# Patient Record
Sex: MALE | Race: WHITE | NOT HISPANIC OR LATINO | Employment: OTHER | URBAN - METROPOLITAN AREA
[De-identification: names, ages, dates, MRNs, and addresses within clinical notes are randomized per-mention and may not be internally consistent; named-entity substitution may affect disease eponyms.]

---

## 2021-01-29 PROBLEM — N40.1 BENIGN PROSTATIC HYPERPLASIA WITH LOWER URINARY TRACT SYMPTOMS: Status: ACTIVE | Noted: 2021-01-29

## 2021-01-29 PROBLEM — N43.3 HYDROCELE, RIGHT: Status: ACTIVE | Noted: 2021-01-29

## 2021-01-29 PROBLEM — R30.0 DYSURIA: Status: ACTIVE | Noted: 2021-01-29

## 2021-05-17 PROBLEM — D80.3 IGG3 SUBCLASS DEFICIENCY: Status: ACTIVE | Noted: 2021-05-17

## 2021-05-17 PROBLEM — D80.3 IGG DEFICIENCY: Status: ACTIVE | Noted: 2021-05-17

## 2021-05-17 PROBLEM — J02.9 PHARYNGITIS: Status: ACTIVE | Noted: 2021-05-17

## 2021-05-25 PROBLEM — H53.2 DIPLOPIA: Status: ACTIVE | Noted: 2021-05-25

## 2021-05-25 PROBLEM — H50.21 HYPERTROPIA OF RIGHT EYE: Status: ACTIVE | Noted: 2021-05-25

## 2021-05-25 PROBLEM — H49.11 RIGHT SUPERIOR OBLIQUE PALSY: Status: ACTIVE | Noted: 2021-05-25

## 2021-07-19 PROBLEM — D72.819 LEUKOPENIA: Status: ACTIVE | Noted: 2021-07-19

## 2021-08-11 PROBLEM — N40.1 BENIGN PROSTATIC HYPERPLASIA WITH LOWER URINARY TRACT SYMPTOMS: Chronic | Status: ACTIVE | Noted: 2021-01-29

## 2021-08-11 PROBLEM — J02.9 PHARYNGITIS: Chronic | Status: ACTIVE | Noted: 2021-05-17

## 2021-08-11 PROBLEM — D80.3 IGG3 SUBCLASS DEFICIENCY: Chronic | Status: ACTIVE | Noted: 2021-05-17

## 2021-08-11 PROBLEM — N43.3 HYDROCELE, RIGHT: Chronic | Status: ACTIVE | Noted: 2021-01-29

## 2021-08-11 PROBLEM — R30.0 DYSURIA: Status: RESOLVED | Noted: 2021-01-29 | Resolved: 2021-08-11

## 2021-08-11 PROBLEM — D72.819 LEUKOPENIA: Chronic | Status: ACTIVE | Noted: 2021-07-19

## 2021-08-11 PROBLEM — H50.21 HYPERTROPIA OF RIGHT EYE: Chronic | Status: ACTIVE | Noted: 2021-05-25

## 2021-08-11 PROBLEM — D80.3 IGG DEFICIENCY: Chronic | Status: ACTIVE | Noted: 2021-05-17

## 2021-08-15 PROBLEM — L20.9 ATOPIC DERMATITIS: Chronic | Status: ACTIVE | Noted: 2021-08-15

## 2021-08-15 PROBLEM — R29.810 WEAKNESS ON LEFT SIDE OF FACE: Chronic | Status: ACTIVE | Noted: 2021-08-15

## 2021-09-28 PROBLEM — G50.0 TRIGEMINAL NEURALGIA: Status: ACTIVE | Noted: 2021-09-28

## 2022-05-18 PROBLEM — G50.0 TRIGEMINAL NEURALGIA: Status: RESOLVED | Noted: 2021-09-28 | Resolved: 2022-05-18

## 2022-05-18 PROBLEM — H53.2 DIPLOPIA: Status: RESOLVED | Noted: 2021-05-25 | Resolved: 2022-05-18

## 2022-05-18 PROBLEM — G56.22 ULNAR NEUROPATHY OF LEFT UPPER EXTREMITY: Status: ACTIVE | Noted: 2022-05-18

## 2022-05-18 PROBLEM — M54.12 CERVICAL RADICULOPATHY AT C8: Status: ACTIVE | Noted: 2022-05-18

## 2022-06-20 PROBLEM — N41.1 CHRONIC PROSTATITIS: Status: ACTIVE | Noted: 2022-06-20

## 2022-07-11 PROBLEM — D69.6 THROMBOCYTOPENIA: Status: ACTIVE | Noted: 2022-07-11

## 2022-07-17 PROBLEM — Z79.899 LONG-TERM CURRENT USE OF INTRAVENOUS IMMUNOGLOBULIN (IVIG): Status: ACTIVE | Noted: 2022-07-17

## 2022-09-26 PROBLEM — N28.1 ACQUIRED RENAL CYST: Status: ACTIVE | Noted: 2022-09-26

## 2022-10-17 PROBLEM — R74.8 ELEVATED ALKALINE PHOSPHATASE LEVEL: Status: ACTIVE | Noted: 2022-10-17

## 2022-10-19 PROBLEM — N32.89 BLADDER MASS: Status: ACTIVE | Noted: 2022-10-19

## 2022-11-07 PROBLEM — N32.89 BLADDER MASS: Status: RESOLVED | Noted: 2022-10-19 | Resolved: 2022-11-07

## 2022-11-16 PROBLEM — J32.0 CHRONIC MAXILLARY SINUSITIS: Status: ACTIVE | Noted: 2022-11-16

## 2022-11-16 PROBLEM — R76.8 HEPATITIS B ANTIBODY POSITIVE: Status: ACTIVE | Noted: 2022-11-16

## 2022-11-16 PROBLEM — Z00.00 HEALTHCARE MAINTENANCE: Status: ACTIVE | Noted: 2022-11-16

## 2022-12-08 PROBLEM — N13.8 BPH WITH OBSTRUCTION/LOWER URINARY TRACT SYMPTOMS: Status: ACTIVE | Noted: 2021-01-29

## 2022-12-15 PROBLEM — R06.02 SOB (SHORTNESS OF BREATH): Status: ACTIVE | Noted: 2022-12-15

## 2022-12-17 PROBLEM — F40.9 FEAR ASSOCIATED WITH ILLNESS AND BODY FUNCTION: Status: ACTIVE | Noted: 2022-12-17

## 2022-12-17 PROBLEM — D80.3: Status: ACTIVE | Noted: 2022-12-17

## 2022-12-17 PROBLEM — R76.8 FALSE-POSITIVE SEROLOGICAL TEST RESULT: Status: ACTIVE | Noted: 2022-12-17

## 2022-12-17 PROBLEM — F40.298: Status: ACTIVE | Noted: 2022-12-17

## 2023-02-20 PROBLEM — Z00.00 HEALTHCARE MAINTENANCE: Status: RESOLVED | Noted: 2022-11-16 | Resolved: 2023-02-20

## 2023-05-02 PROBLEM — B36.0 TINEA VERSICOLOR: Chronic | Status: ACTIVE | Noted: 2023-05-02

## 2023-06-22 PROBLEM — R51.9 FACIAL PAIN: Status: ACTIVE | Noted: 2023-06-22

## 2023-06-22 PROBLEM — J32.0 CHRONIC MAXILLARY SINUSITIS: Status: RESOLVED | Noted: 2022-11-16 | Resolved: 2023-06-22

## 2023-06-22 PROBLEM — J31.0 NONALLERGIC RHINITIS: Status: ACTIVE | Noted: 2023-06-22

## 2023-12-27 DIAGNOSIS — N50.89 TESTICULAR MASS: Primary | ICD-10-CM

## 2024-01-10 ENCOUNTER — TELEPHONE (OUTPATIENT)
Dept: NEUROLOGY | Facility: CLINIC | Age: 64
End: 2024-01-10

## 2024-01-10 NOTE — TELEPHONE ENCOUNTER
Spoke with pt advised pt that we are currently not accepting any outside medicaid  . Pt verbalized understanding  . asdf

## 2024-01-10 NOTE — TELEPHONE ENCOUNTER
----- Message from Jocelyne Juares sent at 1/10/2024  2:33 PM CST -----  Contact: Pt Hernesto @[phone]  Pt calling to speak with the office to see if they received a referral that was fax over on 01/08 to schedule an appointment?  Please call to advise.

## 2024-06-03 DIAGNOSIS — G31.84 MILD COGNITIVE IMPAIRMENT: Primary | ICD-10-CM

## 2024-06-03 DIAGNOSIS — E53.8 B12 DEFICIENCY: ICD-10-CM

## 2024-06-28 ENCOUNTER — TELEPHONE (OUTPATIENT)
Dept: UROLOGY | Facility: CLINIC | Age: 64
End: 2024-06-28

## 2024-06-28 NOTE — TELEPHONE ENCOUNTER
Pt calling to see if we do hydrocele aspiration in the office. I informed pt that we do not, we only perform hydrocelectomy in the OR. Pt states he can not be put under due to health issues.  ----- Message from Ama Velez sent at 6/28/2024  3:39 PM CDT -----  Contact: self  Hydrocele aspiration is this something the clinic does?  [phone]

## 2024-08-27 ENCOUNTER — TELEPHONE (OUTPATIENT)
Dept: OPHTHALMOLOGY | Facility: CLINIC | Age: 64
End: 2024-08-27

## 2024-08-27 NOTE — TELEPHONE ENCOUNTER
Spoke with pt about scheduling about with . He is scheduled for strabismus s/x on 10/24 elsewhere. He wanted to see  before for concerns he has. I'd informed him next available is not until 3/2025. He is going to look elsewhere for sooner appt before s/x.